# Patient Record
Sex: FEMALE | Race: WHITE | NOT HISPANIC OR LATINO
[De-identification: names, ages, dates, MRNs, and addresses within clinical notes are randomized per-mention and may not be internally consistent; named-entity substitution may affect disease eponyms.]

---

## 2017-01-05 ENCOUNTER — MEDICATION RENEWAL (OUTPATIENT)
Age: 32
End: 2017-01-05

## 2017-03-29 ENCOUNTER — EMERGENCY (EMERGENCY)
Facility: HOSPITAL | Age: 32
LOS: 1 days | Discharge: HOME | End: 2017-03-29
Attending: EMERGENCY MEDICINE | Admitting: EMERGENCY MEDICINE
Payer: COMMERCIAL

## 2017-03-29 VITALS
SYSTOLIC BLOOD PRESSURE: 123 MMHG | WEIGHT: 125 LBS | OXYGEN SATURATION: 100 % | RESPIRATION RATE: 20 BRPM | HEART RATE: 90 BPM | HEIGHT: 68 IN | TEMPERATURE: 98 F | DIASTOLIC BLOOD PRESSURE: 81 MMHG

## 2017-03-29 DIAGNOSIS — R69 ILLNESS, UNSPECIFIED: ICD-10-CM

## 2017-03-29 DIAGNOSIS — F41.1 GENERALIZED ANXIETY DISORDER: ICD-10-CM

## 2017-03-29 LAB
ALBUMIN SERPL ELPH-MCNC: 4.8 G/DL — SIGNIFICANT CHANGE UP (ref 3.3–5.2)
ALP SERPL-CCNC: 96 U/L — SIGNIFICANT CHANGE UP (ref 40–120)
ALT FLD-CCNC: 11 U/L — SIGNIFICANT CHANGE UP
ANION GAP SERPL CALC-SCNC: 15 MMOL/L — SIGNIFICANT CHANGE UP (ref 5–17)
APPEARANCE UR: CLEAR — SIGNIFICANT CHANGE UP
AST SERPL-CCNC: 17 U/L — SIGNIFICANT CHANGE UP
BASOPHILS # BLD AUTO: 0 K/UL — SIGNIFICANT CHANGE UP (ref 0–0.2)
BASOPHILS NFR BLD AUTO: 0.3 % — SIGNIFICANT CHANGE UP (ref 0–2)
BILIRUB SERPL-MCNC: 0.6 MG/DL — SIGNIFICANT CHANGE UP (ref 0.4–2)
BILIRUB UR-MCNC: NEGATIVE — SIGNIFICANT CHANGE UP
BUN SERPL-MCNC: 8 MG/DL — SIGNIFICANT CHANGE UP (ref 8–20)
CALCIUM SERPL-MCNC: 9.7 MG/DL — SIGNIFICANT CHANGE UP (ref 8.6–10.2)
CHLORIDE SERPL-SCNC: 100 MMOL/L — SIGNIFICANT CHANGE UP (ref 98–107)
CO2 SERPL-SCNC: 25 MMOL/L — SIGNIFICANT CHANGE UP (ref 22–29)
COLOR SPEC: YELLOW — SIGNIFICANT CHANGE UP
CREAT SERPL-MCNC: 0.55 MG/DL — SIGNIFICANT CHANGE UP (ref 0.5–1.3)
DIFF PNL FLD: ABNORMAL
EOSINOPHIL # BLD AUTO: 0 K/UL — SIGNIFICANT CHANGE UP (ref 0–0.5)
EOSINOPHIL NFR BLD AUTO: 0.2 % — SIGNIFICANT CHANGE UP (ref 0–6)
GLUCOSE SERPL-MCNC: 90 MG/DL — SIGNIFICANT CHANGE UP (ref 70–115)
GLUCOSE UR QL: NEGATIVE MG/DL — SIGNIFICANT CHANGE UP
HCG UR QL: NEGATIVE — SIGNIFICANT CHANGE UP
HCT VFR BLD CALC: 40.2 % — SIGNIFICANT CHANGE UP (ref 37–47)
HGB BLD-MCNC: 13.8 G/DL — SIGNIFICANT CHANGE UP (ref 12–16)
KETONES UR-MCNC: ABNORMAL
LEUKOCYTE ESTERASE UR-ACNC: ABNORMAL
LYMPHOCYTES # BLD AUTO: 1 K/UL — SIGNIFICANT CHANGE UP (ref 1–4.8)
LYMPHOCYTES # BLD AUTO: 14.5 % — LOW (ref 20–55)
MAGNESIUM SERPL-MCNC: 2.1 MG/DL — SIGNIFICANT CHANGE UP (ref 1.8–2.5)
MCHC RBC-ENTMCNC: 31.1 PG — HIGH (ref 27–31)
MCHC RBC-ENTMCNC: 34.3 G/DL — SIGNIFICANT CHANGE UP (ref 32–36)
MCV RBC AUTO: 90.5 FL — SIGNIFICANT CHANGE UP (ref 81–99)
MONOCYTES # BLD AUTO: 0.6 K/UL — SIGNIFICANT CHANGE UP (ref 0–0.8)
MONOCYTES NFR BLD AUTO: 8.6 % — SIGNIFICANT CHANGE UP (ref 3–10)
NEUTROPHILS # BLD AUTO: 5 K/UL — SIGNIFICANT CHANGE UP (ref 1.8–8)
NEUTROPHILS NFR BLD AUTO: 76.4 % — HIGH (ref 37–73)
NITRITE UR-MCNC: NEGATIVE — SIGNIFICANT CHANGE UP
PH UR: 5 — SIGNIFICANT CHANGE UP (ref 4.8–8)
PLATELET # BLD AUTO: 214 K/UL — SIGNIFICANT CHANGE UP (ref 150–400)
POTASSIUM SERPL-MCNC: 4.3 MMOL/L — SIGNIFICANT CHANGE UP (ref 3.5–5.3)
POTASSIUM SERPL-SCNC: 4.3 MMOL/L — SIGNIFICANT CHANGE UP (ref 3.5–5.3)
PROT SERPL-MCNC: 8.3 G/DL — SIGNIFICANT CHANGE UP (ref 6.6–8.7)
PROT UR-MCNC: 30 MG/DL
RBC # BLD: 4.44 M/UL — SIGNIFICANT CHANGE UP (ref 4.4–5.2)
RBC # FLD: 12 % — SIGNIFICANT CHANGE UP (ref 11–15.6)
SODIUM SERPL-SCNC: 140 MMOL/L — SIGNIFICANT CHANGE UP (ref 135–145)
SP GR SPEC: 1.02 — SIGNIFICANT CHANGE UP (ref 1.01–1.02)
T3 SERPL-MCNC: 141 NG/DL — SIGNIFICANT CHANGE UP (ref 80–200)
T4 AB SER-ACNC: 8.3 UG/DL — SIGNIFICANT CHANGE UP (ref 4.5–12)
TSH SERPL-MCNC: 1.44 UIU/ML — SIGNIFICANT CHANGE UP (ref 0.27–4.2)
UROBILINOGEN FLD QL: NEGATIVE MG/DL — SIGNIFICANT CHANGE UP
WBC # BLD: 6.6 K/UL — SIGNIFICANT CHANGE UP (ref 4.8–10.8)
WBC # FLD AUTO: 6.6 K/UL — SIGNIFICANT CHANGE UP (ref 4.8–10.8)

## 2017-03-29 PROCEDURE — 84480 ASSAY TRIIODOTHYRONINE (T3): CPT

## 2017-03-29 PROCEDURE — 85027 COMPLETE CBC AUTOMATED: CPT

## 2017-03-29 PROCEDURE — 99283 EMERGENCY DEPT VISIT LOW MDM: CPT

## 2017-03-29 PROCEDURE — 84443 ASSAY THYROID STIM HORMONE: CPT

## 2017-03-29 PROCEDURE — 90792 PSYCH DIAG EVAL W/MED SRVCS: CPT

## 2017-03-29 PROCEDURE — 83735 ASSAY OF MAGNESIUM: CPT

## 2017-03-29 PROCEDURE — 80053 COMPREHEN METABOLIC PANEL: CPT

## 2017-03-29 PROCEDURE — 99284 EMERGENCY DEPT VISIT MOD MDM: CPT | Mod: 25

## 2017-03-29 PROCEDURE — 81025 URINE PREGNANCY TEST: CPT

## 2017-03-29 PROCEDURE — 81001 URINALYSIS AUTO W/SCOPE: CPT

## 2017-03-29 PROCEDURE — 84436 ASSAY OF TOTAL THYROXINE: CPT

## 2017-03-29 RX ORDER — ESCITALOPRAM OXALATE 10 MG/1
0 TABLET, FILM COATED ORAL
Qty: 0 | Refills: 0 | COMMUNITY
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
## 2017-03-29 NOTE — ED ADULT TRIAGE NOTE - CHIEF COMPLAINT QUOTE
patient states feeling weak and nauseous with out appatite that startedsinc starting on new meds on saturday patient states started on Lexpro

## 2017-03-29 NOTE — ED STATDOCS - OBJECTIVE STATEMENT
30 y/o F pt w/ PMHx of anxiety presents to the ED c/o chronic anxiety, progressively worsening 5 days ago. Pt also notes generalized weakness, nausea, loss of appetite, diarrhea, and crying. Pt states that she was started on Lexapro on Saturday but feels that it is not working. Pt notes that she has been feeling very stressed as of late. Pt denies fever, cough, abd pain, chest pain, SOB, insomnia, recent foreign travel, or any other complaints. NKDA. Pt sees a mental health NP who started her on the medication. Pt has been seeing a psychiatrist for many years. Pt is a teacher.

## 2017-03-29 NOTE — ED BEHAVIORAL HEALTH ASSESSMENT NOTE - SUICIDE PROTECTIVE FACTORS
Positive therapeutic relationships/Future oriented/Engaged in work or school/Supportive social network or family

## 2017-03-29 NOTE — ED STATDOCS - NS ED MD SCRIBE ATTENDING SCRIBE SECTIONS
HIV/PAST MEDICAL/SURGICAL/SOCIAL HISTORY/VITAL SIGNS( Pullset)/DISPOSITION/REVIEW OF SYSTEMS/HISTORY OF PRESENT ILLNESS/PHYSICAL EXAM

## 2017-03-29 NOTE — ED ADULT NURSE NOTE - OBJECTIVE STATEMENT
pt presents to ED with nausea, decreased PO intake, anxiety secondary starting new RX of lexapro 5 days ago. pt states she has been feeling very amxious lately with recent weight loss of ~10lbs. breathing si even and unlabored. a&ox3 PERRL. maex4 lungs cta denies chest pain/sob. abd soft nt nd +bs. skin w.d.i, will continue to monitor and reassess

## 2017-03-29 NOTE — ED STATDOCS - PROGRESS NOTE DETAILS
patient re-evaluated c/o generalized nausea, lack of appetite and diahrea x 1 week, associated with anxiety, patient admits has a lot of stressors in life with marriage and family and see's therapist weekly x 4 years and was recently started on Lexapro x 4 days ago by psych NP.  She notes has been having worsening physical symptoms after starting medication.  She is a  and as a result of her symptoms has been affecting her daily day to day life activities.  She denies any abdominal pain, fevers, chills, sick contacts, recent travel or rashes.  PE: chest CTAB, heart s1s2, abdomen: soft, NT, ND.  Plan will contact psych for evaluation, pending labs.  Will re-eval spoke to psych MD Chin patient will need f/u and cognetive therapy, given information to f/u.

## 2017-03-29 NOTE — ED BEHAVIORAL HEALTH ASSESSMENT NOTE - DETAILS
sister OCD stroke - grandfather father alcoholism, brother polysubstance abuse traumatic first well woman's exam generalized fatigue nausea, gagging, diarrhea, decreased appetite self

## 2017-03-29 NOTE — ED BEHAVIORAL HEALTH ASSESSMENT NOTE - RISK ASSESSMENT
low - patient has severe anxiety with recent change in treatment, but many protective factors such as supportive family, therapeutic relationship, engagement in work and future oriented.

## 2017-03-29 NOTE — ED BEHAVIORAL HEALTH ASSESSMENT NOTE - SUMMARY
30 yo , domiciled,  female presents to ED for worsening anxiety after starting Lexapro x4 days. Patients anxiety and somatic symptoms have been present since before starting the Lexapro with the addition of gagging/ dry heaving and nausea, which started Sunday night when she started thinking about going into work the next day. Patient has extensive outpatient therapy, but it is unclear whether she has been receiving CBT. Recommend outpatient CBT and follow-up with her outpatient psychiatric NP regarding medication. Treat and release.

## 2017-03-29 NOTE — ED BEHAVIORAL HEALTH ASSESSMENT NOTE - DESCRIPTION
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

## 2018-05-12 ENCOUNTER — APPOINTMENT (OUTPATIENT)
Dept: OBGYN | Facility: CLINIC | Age: 33
End: 2018-05-12
Payer: COMMERCIAL

## 2018-05-12 VITALS
HEIGHT: 68 IN | WEIGHT: 135 LBS | BODY MASS INDEX: 20.46 KG/M2 | DIASTOLIC BLOOD PRESSURE: 60 MMHG | SYSTOLIC BLOOD PRESSURE: 100 MMHG

## 2018-05-12 PROCEDURE — 99213 OFFICE O/P EST LOW 20 MIN: CPT

## 2018-05-22 LAB
HSV+VZV DNA SPEC QL NAA+PROBE: NOT DETECTED
SPECIMEN SOURCE: NORMAL

## 2018-11-28 PROBLEM — M41.9 SCOLIOSIS, UNSPECIFIED: Chronic | Status: ACTIVE | Noted: 2017-03-29

## 2018-11-28 PROBLEM — F41.9 ANXIETY DISORDER, UNSPECIFIED: Chronic | Status: ACTIVE | Noted: 2017-03-29

## 2018-11-29 ENCOUNTER — APPOINTMENT (OUTPATIENT)
Dept: OBGYN | Facility: CLINIC | Age: 33
End: 2018-11-29
Payer: COMMERCIAL

## 2018-11-29 VITALS
BODY MASS INDEX: 21.73 KG/M2 | DIASTOLIC BLOOD PRESSURE: 78 MMHG | WEIGHT: 143.38 LBS | HEIGHT: 68 IN | SYSTOLIC BLOOD PRESSURE: 100 MMHG

## 2018-11-29 DIAGNOSIS — N76.0 ACUTE VAGINITIS: ICD-10-CM

## 2018-11-29 DIAGNOSIS — Z86.59 PERSONAL HISTORY OF OTHER MENTAL AND BEHAVIORAL DISORDERS: ICD-10-CM

## 2018-11-29 PROCEDURE — 99395 PREV VISIT EST AGE 18-39: CPT

## 2018-12-03 LAB — HPV HIGH+LOW RISK DNA PNL CVX: NOT DETECTED

## 2018-12-06 LAB — CYTOLOGY CVX/VAG DOC THIN PREP: NORMAL

## 2018-12-07 NOTE — ED BEHAVIORAL HEALTH ASSESSMENT NOTE - NS ED [facility] ED COURSE UTILIZATION OF 1 TO 1 IN ED YN
"Pt with concerns of side effects of omeprazole.  Pt states "i can 't take this".  Pt notified Omeprazole ordered for 30 days with 2 refills, not long term usage. Pt acknowledged understanding.   "
----- Message from Mame Thompson sent at 12/7/2018  2:06 PM CST -----  Contact: Patient  GI:  Patient called, she is concerned about taking Prilosec so often due to side effects.  She has multiple questions and is requesting a nurse call back to [phone].  Thank you  abc  
No

## 2019-10-14 ENCOUNTER — APPOINTMENT (OUTPATIENT)
Dept: ORTHOPEDIC SURGERY | Facility: CLINIC | Age: 34
End: 2019-10-14
Payer: COMMERCIAL

## 2019-10-14 VITALS
HEIGHT: 68 IN | SYSTOLIC BLOOD PRESSURE: 110 MMHG | WEIGHT: 147 LBS | HEART RATE: 80 BPM | DIASTOLIC BLOOD PRESSURE: 74 MMHG | BODY MASS INDEX: 22.28 KG/M2

## 2019-10-14 DIAGNOSIS — M25.561 PAIN IN RIGHT KNEE: ICD-10-CM

## 2019-10-14 PROCEDURE — 99203 OFFICE O/P NEW LOW 30 MIN: CPT

## 2019-10-14 PROCEDURE — 73503 X-RAY EXAM HIP UNI 4/> VIEWS: CPT | Mod: LT

## 2019-10-14 NOTE — HISTORY OF PRESENT ILLNESS
[Improving] : improving [8] : an average pain level of 8/10 [10] : a maximum pain level of 10/10 [Direct Pressure] : worsened by direct pressure [Rest] : relieved by rest [de-identified] : YAYO is a 33 year female who presents today for initial evaluation of left hip pain and bruising following a fall on 7/16/19.  The pain is dull, ahcy in nature.  She was evaluated at an  on 7/16/19.  Patient endorsed immediate pain and bruising.  She took Motrin initially but does not take any pain medications currently.  Her pain is located to her iliac crest and is worsened with direct pressure.  Wearing jeans increases her pain and she has been trying to wear loose fitting clothing.  Patient does not have pain motion/muscle contractions.  She denies numbness/tingling to the LE.  She also has medial right leg pain from impact during the fall as well.\par

## 2019-10-14 NOTE — PHYSICAL EXAM
[de-identified] : \par The following radiographs were ordered and read by me during this patients visit. I reviewed each radiograph in detail with the patient and discussed the findings as highlighted below. \par \par 4views of the left hip were obtained today that show no fracture, dislocation. There is no degenerative change seen. There is no malalignment. No obvious osseous abnormality. Otherwise unremarkable. [de-identified] : Physical Exam:\par General: Well appearing, no acute distress, A&O\par Neurologic: A&Ox3, No focal deficits\par Head: NCAT without abrasions, lacerations, or ecchymosis to head, face, or scalp\par Eyes: No scleral icterus, no gross abnormalities\par Respiratory: Equal chest wall expansion bilaterally, no accessory muscle use\par Lymphatic: No lymphadenopathy palpated\par Skin: Warm and dry\par Psychiatric: Normal mood and affect\par Left Hip Exam\par \par Skin: Clean/dry and intact\par Inspection: No obvious deformity, no swelling. Tenderness noted over the iliac crest. Bruising noted over this area.\par Pulses: 2+ DP/PT pulses\par ROM: 120 flexion without pain. Internal rotation to 15. External rotation to 45.\par Painful ROM: None\par Tenderness: No tenderness over greater trochanter/glut medius insertion. No groin pain. No ttp over the ASIS\par Strength: 5/5 ADD/ABD/Q/H/TA/GS/EHL\par Neuro: Sensation in tact to light touch throughout, DTR normal\par Additional tests: Negative impingement test\par \par Left knee\par \par \par Inspection/Palpation: There is no inguinal adenopathy. Well perfused, without skin lesions, shows a grossly normal motor and sensory examination. ROM: Normal\par Muscle/Nerves: Quad strength decreased secondary to injury. Motor exam 5/ distally, EHL/FHL/GSC/TA\par SILT L4-S1\par Special Tests: \par No Joint line tenderness noted  at medial and lateral joint line at 0 and 90 degrees. \par Stable in varus and valgus stress at 0 and 30 degrees\par Negative posterior drawer. \par Negative anterior drawer and stable Lachman \par Normal hip and ankle exam.\par \par Right Knee\par \par Inspection/Palpation: There is no inguinal adenopathy. Well perfused, without skin lesions, shows a grossly normal motor and sensory examination. Mild bruising noted over the past answering. Neck tenderness noted over this area as well to\par ROM: Normal\par Muscle/Nerves: Quad strength decreased secondary to injury. Motor exam 5/ distally, EHL/FHL/GSC/TA\par SILT L4-S1\par Special Tests: \par No Joint line tenderness noted  at medial and lateral joint line at 0 and 90 degrees. \par Stable in varus and valgus stress at 0 and 30 degrees\par Negative posterior drawer. \par Negative anterior drawer and stable Lachman \par Normal hip and ankle exam.\par

## 2019-10-14 NOTE — DISCUSSION/SUMMARY
[de-identified] : Key is a 33-year-old female sustained a fall onto her left hip and right knee 3 months ago. It appears she had a hip pointer with contusion over the iliac crest. She also has a contusion over the past answering. Then a thorough discussion with her regarding the nature of her symptoms as well as all treatment options. Discussed both operative and nonoperative management. At this time she elected for nonoperative management with oral anti-inflammatories physical therapy and ice to the areas. A repeat of her from meloxicam once daily for 21 days. A suture for physical therapy. I'll see her back 6-8 weeks if she shows no improvement. She agrees with the above plan and all questions were answered.

## 2019-10-14 NOTE — REVIEW OF SYSTEMS
[Joint Pain] : joint pain [Joint Stiffness] : joint stiffness [Negative] : Heme/Lymph [FreeTextEntry9] : right knee and left hip

## 2019-12-02 ENCOUNTER — APPOINTMENT (OUTPATIENT)
Dept: ORTHOPEDIC SURGERY | Facility: CLINIC | Age: 34
End: 2019-12-02
Payer: COMMERCIAL

## 2019-12-02 DIAGNOSIS — M25.552 PAIN IN LEFT HIP: ICD-10-CM

## 2019-12-02 PROCEDURE — 99213 OFFICE O/P EST LOW 20 MIN: CPT

## 2019-12-02 RX ORDER — LIDOCAINE 5% 5 G/100G
5 CREAM TOPICAL
Qty: 1 | Refills: 0 | Status: COMPLETED | COMMUNITY
Start: 2019-12-02 | End: 2019-12-17

## 2019-12-02 NOTE — PHYSICAL EXAM
[de-identified] : Physical Exam:\par General: Well appearing, no acute distress, A&O\par Neurologic: A&Ox3, No focal deficits\par Head: NCAT without abrasions, lacerations, or ecchymosis to head, face, or scalp\par Eyes: No scleral icterus, no gross abnormalities\par Respiratory: Equal chest wall expansion bilaterally, no accessory muscle use\par Lymphatic: No lymphadenopathy palpated\par Skin: Warm and dry\par Psychiatric: Normal mood and affect\par Left Hip Exam\par \par Skin: Clean/dry and intact\par Inspection: No obvious deformity, no swelling. Tenderness noted over the iliac crest. Bruising noted over this area.\par Pulses: 2+ DP/PT pulses\par ROM: 120 flexion without pain. Internal rotation to 15. External rotation to 45.\par Painful ROM: None\par Tenderness: No tenderness over greater trochanter/glut medius insertion. No groin pain. No ttp over the ASIS\par Strength: 5/5 ADD/ABD/Q/H/TA/GS/EHL\par Neuro: Sensation in tact to light touch throughout, DTR normal\par Additional tests: Negative impingement test\par \par Left knee\par \par \par Inspection/Palpation: There is no inguinal adenopathy. Well perfused, without skin lesions, shows a grossly normal motor and sensory examination. ROM: Normal\par Muscle/Nerves: Quad strength decreased secondary to injury. Motor exam 5/ distally, EHL/FHL/GSC/TA\par SILT L4-S1\par Special Tests: \par No Joint line tenderness noted  at medial and lateral joint line at 0 and 90 degrees. \par Stable in varus and valgus stress at 0 and 30 degrees\par Negative posterior drawer. \par Negative anterior drawer and stable Lachman \par Normal hip and ankle exam.\par \par Right Knee\par \par Inspection/Palpation: There is no inguinal adenopathy. Well perfused, without skin lesions, shows a grossly normal motor and sensory examination. Mild bruising noted over the past answering. Neck tenderness noted over this area as well to\par ROM: Normal\par Muscle/Nerves: Quad strength decreased secondary to injury. Motor exam 5/ distally, EHL/FHL/GSC/TA\par SILT L4-S1\par Special Tests: \par No Joint line tenderness noted  at medial and lateral joint line at 0 and 90 degrees. \par Stable in varus and valgus stress at 0 and 30 degrees\par Negative posterior drawer. \par Negative anterior drawer and stable Lachman \par Normal hip and ankle exam.\par

## 2019-12-02 NOTE — DISCUSSION/SUMMARY
[de-identified] : Key comes in for follow-up of her left hip pointer.  Overall she feels 60 to 70% improved.  She still has some weakness with hip abduction.  At this time given her increase in sensitivity I recommend lidocaine cream to be massaged through daily.  She will continue physical therapy as well.  I will see her back in 4 weeks for clinical reassessment.  If she has no change in symptoms we may consider an MRI.  She agrees with the above plan and all questions were answered.

## 2020-01-06 ENCOUNTER — APPOINTMENT (OUTPATIENT)
Dept: ORTHOPEDIC SURGERY | Facility: CLINIC | Age: 35
End: 2020-01-06
Payer: COMMERCIAL

## 2020-01-06 DIAGNOSIS — S30.1XXA CONTUSION OF ABDOMINAL WALL, INITIAL ENCOUNTER: ICD-10-CM

## 2020-01-06 PROCEDURE — 99213 OFFICE O/P EST LOW 20 MIN: CPT

## 2020-01-06 NOTE — HISTORY OF PRESENT ILLNESS
[de-identified] : YAYO is a 33 year female who presents today for followup evaluation of left hip pain and bruising following a fall on 7/16/19.  The pain has improved but is still noticable.  Patient complains of continued bruising. She has been going to physical therapy with some relief. She continues to feel restricted due to pain. She took a 21-day course of Meloxicam which provided minimal relief. Her pain is located to her iliac crest and is worsened with direct pressure.  Wearing jeans increases her pain and she has been trying to wear loose fitting clothing.  Patient does not have pain motion/muscle contractions.  She denies numbness/tingling to the LE.

## 2020-01-06 NOTE — DISCUSSION/SUMMARY
[de-identified] : Key comes in for follow-up of her left hip pointer.  She still has significant pain and bruising over the region.  She is attempted conservative measures over the past 6 months with some mild improvement.  Given that she has not had complete resolution I recommend an MRI to assess the soft tissues as well as the pelvis.  I will call her with the results.  She agrees with the above plan and all questions were answered.

## 2020-01-06 NOTE — PHYSICAL EXAM
[de-identified] : Physical Exam:\par General: Well appearing, no acute distress, A&O\par Neurologic: A&Ox3, No focal deficits\par Head: NCAT without abrasions, lacerations, or ecchymosis to head, face, or scalp\par Eyes: No scleral icterus, no gross abnormalities\par Respiratory: Equal chest wall expansion bilaterally, no accessory muscle use\par Lymphatic: No lymphadenopathy palpated\par Skin: Warm and dry\par Psychiatric: Normal mood and affect\par Left Hip Exam\par \par Skin: Clean/dry and intact\par Inspection: No obvious deformity, no swelling. Tenderness noted over the iliac crest. Bruising noted over this area.\par Pulses: 2+ DP/PT pulses\par ROM: 120 flexion without pain. Internal rotation to 15. External rotation to 45.\par Painful ROM: None\par Tenderness: No tenderness over greater trochanter/glut medius insertion. No groin pain. No ttp over the ASIS\par Strength: 5/5 ADD/ABD/Q/H/TA/GS/EHL\par Neuro: Sensation in tact to light touch throughout, DTR normal\par Additional tests: Negative impingement test\par \par Left knee\par \par \par Inspection/Palpation: There is no inguinal adenopathy. Well perfused, without skin lesions, shows a grossly normal motor and sensory examination. ROM: Normal\par Muscle/Nerves: Quad strength decreased secondary to injury. Motor exam 5/ distally, EHL/FHL/GSC/TA\par SILT L4-S1\par Special Tests: \par No Joint line tenderness noted  at medial and lateral joint line at 0 and 90 degrees. \par Stable in varus and valgus stress at 0 and 30 degrees\par Negative posterior drawer. \par Negative anterior drawer and stable Lachman \par Normal hip and ankle exam.\par \par Right Knee\par \par Inspection/Palpation: There is no inguinal adenopathy. Well perfused, without skin lesions, shows a grossly normal motor and sensory examination. Mild bruising noted over the past answering. Neck tenderness noted over this area as well to\par ROM: Normal\par Muscle/Nerves: Quad strength decreased secondary to injury. Motor exam 5/ distally, EHL/FHL/GSC/TA\par SILT L4-S1\par Special Tests: \par No Joint line tenderness noted  at medial and lateral joint line at 0 and 90 degrees. \par Stable in varus and valgus stress at 0 and 30 degrees\par Negative posterior drawer. \par Negative anterior drawer and stable Lachman \par Normal hip and ankle exam.\par

## 2020-01-08 ENCOUNTER — FORM ENCOUNTER (OUTPATIENT)
Age: 35
End: 2020-01-08

## 2020-01-09 ENCOUNTER — APPOINTMENT (OUTPATIENT)
Dept: MRI IMAGING | Facility: CLINIC | Age: 35
End: 2020-01-09
Payer: COMMERCIAL

## 2020-01-09 ENCOUNTER — OUTPATIENT (OUTPATIENT)
Dept: OUTPATIENT SERVICES | Facility: HOSPITAL | Age: 35
LOS: 1 days | End: 2020-01-09
Payer: COMMERCIAL

## 2020-01-09 DIAGNOSIS — S30.1XXA CONTUSION OF ABDOMINAL WALL, INITIAL ENCOUNTER: ICD-10-CM

## 2020-01-09 DIAGNOSIS — Z00.8 ENCOUNTER FOR OTHER GENERAL EXAMINATION: ICD-10-CM

## 2020-01-09 PROCEDURE — 72195 MRI PELVIS W/O DYE: CPT | Mod: 26

## 2020-01-09 PROCEDURE — 72195 MRI PELVIS W/O DYE: CPT

## 2020-04-08 ENCOUNTER — APPOINTMENT (OUTPATIENT)
Dept: OBGYN | Facility: CLINIC | Age: 35
End: 2020-04-08
Payer: COMMERCIAL

## 2020-04-08 PROCEDURE — 99443: CPT

## 2021-01-05 ENCOUNTER — APPOINTMENT (OUTPATIENT)
Dept: DERMATOLOGY | Facility: CLINIC | Age: 36
End: 2021-01-05
Payer: COMMERCIAL

## 2021-01-05 DIAGNOSIS — L91.8 OTHER HYPERTROPHIC DISORDERS OF THE SKIN: ICD-10-CM

## 2021-01-05 DIAGNOSIS — D22.5 MELANOCYTIC NEVI OF TRUNK: ICD-10-CM

## 2021-01-05 PROCEDURE — 99203 OFFICE O/P NEW LOW 30 MIN: CPT

## 2021-01-05 PROCEDURE — 99072 ADDL SUPL MATRL&STAF TM PHE: CPT

## 2021-01-05 RX ORDER — MELOXICAM 7.5 MG/1
7.5 TABLET ORAL DAILY
Qty: 21 | Refills: 1 | Status: DISCONTINUED | COMMUNITY
Start: 2019-10-14 | End: 2021-01-05

## 2021-01-05 RX ORDER — VALACYCLOVIR 500 MG/1
500 TABLET, FILM COATED ORAL TWICE DAILY
Qty: 60 | Refills: 3 | Status: DISCONTINUED | COMMUNITY
Start: 2018-05-12 | End: 2021-01-05

## 2021-01-05 RX ORDER — ACYCLOVIR 50 MG/G
5 CREAM TOPICAL
Qty: 1 | Refills: 3 | Status: DISCONTINUED | COMMUNITY
Start: 2018-05-12 | End: 2021-01-05

## 2021-01-05 NOTE — ASSESSMENT
[FreeTextEntry1] : Melanocytic nevus in right upper back\par Skin tag in right lateral canthus\par Probable incipient skin tags on both eyelids

## 2021-01-05 NOTE — PHYSICAL EXAM
[Alert] : alert [Oriented x 3] : ~L oriented x 3 [Well Nourished] : well nourished [FreeTextEntry3] : The following areas were examined and no significant abnormalities were seen except as noted below:\par \par Type II skin\par \par scalp, face, eyelids, nose, lips, ears, neck, chest, abdomen, back, buttocks, right arm, left arm, right hand, left hand,\par right  leg, left leg, right foot, left foot\par Breast and groin exams offered and declined by patient.\par \par Upper eyelids: Rare 1 mm skin-colored papules\par Small excrescence present on the right lateral canthus\par Right upper back: 6 x 4 mm brown mildly irregular smooth plaque-not suspicious on dermoscopy (patient states she has had this "a long time")\par \par No suspicious lesions seen

## 2021-01-05 NOTE — HISTORY OF PRESENT ILLNESS
[FreeTextEntry1] : Evaluation of growths [de-identified] : First visit for [age] white female presenting for evaluation of growths.  Due to the concern about lesions on the eyelids. No history of skin cancer.

## 2022-04-22 ENCOUNTER — APPOINTMENT (OUTPATIENT)
Dept: OBGYN | Facility: CLINIC | Age: 37
End: 2022-04-22
Payer: COMMERCIAL

## 2022-04-22 ENCOUNTER — TRANSCRIPTION ENCOUNTER (OUTPATIENT)
Age: 37
End: 2022-04-22

## 2022-04-22 VITALS
BODY MASS INDEX: 22.58 KG/M2 | WEIGHT: 149 LBS | SYSTOLIC BLOOD PRESSURE: 108 MMHG | DIASTOLIC BLOOD PRESSURE: 70 MMHG | HEIGHT: 68 IN

## 2022-04-22 DIAGNOSIS — Z01.419 ENCOUNTER FOR GYNECOLOGICAL EXAMINATION (GENERAL) (ROUTINE) W/OUT ABNORMAL FINDINGS: ICD-10-CM

## 2022-04-22 DIAGNOSIS — B00.9 HERPESVIRAL INFECTION, UNSPECIFIED: ICD-10-CM

## 2022-04-22 PROCEDURE — 99395 PREV VISIT EST AGE 18-39: CPT

## 2022-04-22 NOTE — HISTORY OF PRESENT ILLNESS
[FreeTextEntry1] : 35yo has had 3 bouts of HSV march into april..\par Her brother passed from an over dose.a year ago.\par

## 2022-04-22 NOTE — DISCUSSION/SUMMARY
[FreeTextEntry1] : disc. saving her eggs.\par she still has pain with having sex,\par she also is afraid of getting pregnant.\par She did do well with pelvic foor PT \par since her brother passed away she hasn’t even tried to have sex\par gave her daily valtrrex she can do episodic treatment too.\par cycles are 12 days long and 1 day of pain ,disc OC which may relax her about getting preg too. she will talk to her

## 2022-04-23 LAB — HPV HIGH+LOW RISK DNA PNL CVX: NOT DETECTED

## 2022-05-02 LAB — CYTOLOGY CVX/VAG DOC THIN PREP: NORMAL

## 2022-08-08 ENCOUNTER — APPOINTMENT (OUTPATIENT)
Dept: FAMILY MEDICINE | Facility: CLINIC | Age: 37
End: 2022-08-08

## 2022-08-08 VITALS
OXYGEN SATURATION: 98 % | HEIGHT: 68 IN | RESPIRATION RATE: 16 BRPM | HEART RATE: 80 BPM | BODY MASS INDEX: 21.82 KG/M2 | TEMPERATURE: 97.7 F | WEIGHT: 144 LBS | SYSTOLIC BLOOD PRESSURE: 110 MMHG | DIASTOLIC BLOOD PRESSURE: 70 MMHG

## 2022-08-08 DIAGNOSIS — Z13.220 ENCOUNTER FOR SCREENING FOR LIPOID DISORDERS: ICD-10-CM

## 2022-08-08 DIAGNOSIS — Z13.21 ENCOUNTER FOR SCREENING FOR NUTRITIONAL DISORDER: ICD-10-CM

## 2022-08-08 DIAGNOSIS — U07.1 COVID-19: ICD-10-CM

## 2022-08-08 DIAGNOSIS — Z13.29 ENCOUNTER FOR SCREENING FOR OTHER SUSPECTED ENDOCRINE DISORDER: ICD-10-CM

## 2022-08-08 DIAGNOSIS — Z82.3 FAMILY HISTORY OF STROKE: ICD-10-CM

## 2022-08-08 DIAGNOSIS — Z13.228 ENCOUNTER FOR SCREENING FOR OTHER SUSPECTED ENDOCRINE DISORDER: ICD-10-CM

## 2022-08-08 DIAGNOSIS — Z13.0 ENCOUNTER FOR SCREENING FOR OTHER SUSPECTED ENDOCRINE DISORDER: ICD-10-CM

## 2022-08-08 PROCEDURE — 99385 PREV VISIT NEW AGE 18-39: CPT | Mod: 25

## 2022-08-08 PROCEDURE — 96127 BRIEF EMOTIONAL/BEHAV ASSMT: CPT

## 2022-08-08 RX ORDER — ESCITALOPRAM OXALATE 10 MG/1
10 TABLET, FILM COATED ORAL
Refills: 0 | Status: DISCONTINUED | COMMUNITY
End: 2022-08-08

## 2022-08-08 NOTE — HISTORY OF PRESENT ILLNESS
[FreeTextEntry1] : Establish care, annual physical  [de-identified] : 36 year old female presents as a new patient to establish care, for annual physical \par \par She has anxiety/depression\par she follows with a  as well as a psychiatric NP\par has gone for cognitive behavioral therapy in the past \par currently on Lexapro 20 mg daily\par finds she does not do well without routine/structure\par she finds it hard during the summer when she is not working\par finds she over thinks often\par also still grieving- her brother passed away 2 years ago from a drug overdose\par her grandmother passed away 1 and 1/2 years ago \par reconnected with her father when her brother passed away (her parents are , father is remarried and lives in [place])\par she often visits her sisters who live in [place] \par \par h/o genital herpes\par takes Valtrex when needed \par \par reports having heavy menses \par \par h/o painful intercourse\par has tried using vaginal dilators previously \par \par follows with dermatology for skin examinations- last seen in Dec 2021

## 2022-08-08 NOTE — PHYSICAL EXAM
[No Acute Distress] : no acute distress [Well Nourished] : well nourished [Well Developed] : well developed [Well-Appearing] : well-appearing [Normal Sclera/Conjunctiva] : normal sclera/conjunctiva [PERRL] : pupils equal round and reactive to light [Normal Outer Ear/Nose] : the outer ears and nose were normal in appearance [Normal Oropharynx] : the oropharynx was normal [Normal TMs] : both tympanic membranes were normal [Normal Appearance] : was normal in appearance [Neck Supple] : was supple [Normal] : the thyroid was normal [Normal Rate] : normal rate  [Regular Rhythm] : with a regular rhythm [Normal S1, S2] : normal S1 and S2 [No Murmur] : no murmur heard [No Carotid Bruits] : no carotid bruits [No Edema] : there was no peripheral edema [Soft] : abdomen soft [Non Tender] : non-tender [Normal Bowel Sounds] : normal bowel sounds [Normal Posterior Cervical Nodes] : no posterior cervical lymphadenopathy [Normal Anterior Cervical Nodes] : no anterior cervical lymphadenopathy [No Spinal Tenderness] : no spinal tenderness [Grossly Normal Strength/Tone] : grossly normal strength/tone [No Rash] : no rash [No Focal Deficits] : no focal deficits [Alert and Oriented x3] : oriented to person, place, and time [Normal Insight/Judgement] : insight and judgment were intact

## 2022-08-08 NOTE — ASSESSMENT
[FreeTextEntry1] : Health care maintenance:\par check blood work, blood drawn in office\par follow up with optometry/ophthalmology and dentist for routine exams when due\par follow up with dermatology when due \par up to date with GYN \par c/w diet and exercise \par recommend Flu vaccine in the fall\par \par Anxiety/Depression:\par on Lexapro 20 mg daily, has f/u scheduled with her psychiatric NP next month\par follows with her therapist/ every other week\par consider referral to behavioral health counselor \par check blood work \par \par

## 2022-08-08 NOTE — HEALTH RISK ASSESSMENT
[Never] : Never [No] : In the past 12 months have you used drugs other than those required for medical reasons? No [2] : 2) Feeling down, depressed, or hopeless for more than half of the days (2) [PHQ-2 Positive] : PHQ-2 Positive [PHQ-9 Positive] : PHQ-9 Positive [I have developed a follow-up plan documented below in the note.] : I have developed a follow-up plan documented below in the note. [Patient reported PAP Smear was normal] : Patient reported PAP Smear was normal [With Significant Other] : lives with significant other [Employed] : employed [] :  [Seat Belt] :  uses seat belt [Sunscreen] : uses sunscreen [de-identified] : Walks  [[id number]] : 4 [PapSmearDate] : 04/2022 [FreeTextEntry2] : High School

## 2022-08-09 LAB
25(OH)D3 SERPL-MCNC: 41.6 NG/ML
ALBUMIN SERPL ELPH-MCNC: 4.8 G/DL
ALP BLD-CCNC: 96 U/L
ALT SERPL-CCNC: 12 U/L
ANION GAP SERPL CALC-SCNC: 15 MMOL/L
AST SERPL-CCNC: 17 U/L
BASOPHILS # BLD AUTO: 0.03 K/UL
BASOPHILS NFR BLD AUTO: 0.7 %
BILIRUB SERPL-MCNC: 0.4 MG/DL
BUN SERPL-MCNC: 12 MG/DL
CALCIUM SERPL-MCNC: 9.6 MG/DL
CHLORIDE SERPL-SCNC: 104 MMOL/L
CHOLEST SERPL-MCNC: 165 MG/DL
CO2 SERPL-SCNC: 20 MMOL/L
CREAT SERPL-MCNC: 0.69 MG/DL
EGFR: 116 ML/MIN/1.73M2
EOSINOPHIL # BLD AUTO: 0.17 K/UL
EOSINOPHIL NFR BLD AUTO: 4 %
ESTIMATED AVERAGE GLUCOSE: 100 MG/DL
FERRITIN SERPL-MCNC: 49 NG/ML
FOLATE SERPL-MCNC: >20 NG/ML
GLUCOSE SERPL-MCNC: 81 MG/DL
HBA1C MFR BLD HPLC: 5.1 %
HCT VFR BLD CALC: 36.4 %
HDLC SERPL-MCNC: 55 MG/DL
HGB BLD-MCNC: 11.6 G/DL
IMM GRANULOCYTES NFR BLD AUTO: 0.2 %
IRON SATN MFR SERPL: 32 %
IRON SERPL-MCNC: 97 UG/DL
LDLC SERPL CALC-MCNC: 89 MG/DL
LYMPHOCYTES # BLD AUTO: 1.66 K/UL
LYMPHOCYTES NFR BLD AUTO: 38.9 %
MAGNESIUM SERPL-MCNC: 2.2 MG/DL
MAN DIFF?: NORMAL
MCHC RBC-ENTMCNC: 30.1 PG
MCHC RBC-ENTMCNC: 31.9 GM/DL
MCV RBC AUTO: 94.5 FL
MONOCYTES # BLD AUTO: 0.47 K/UL
MONOCYTES NFR BLD AUTO: 11 %
NEUTROPHILS # BLD AUTO: 1.93 K/UL
NEUTROPHILS NFR BLD AUTO: 45.2 %
NONHDLC SERPL-MCNC: 110 MG/DL
PLATELET # BLD AUTO: 202 K/UL
POTASSIUM SERPL-SCNC: 4.3 MMOL/L
PROT SERPL-MCNC: 7.4 G/DL
RBC # BLD: 3.85 M/UL
RBC # FLD: 12.5 %
SODIUM SERPL-SCNC: 139 MMOL/L
T3FREE SERPL-MCNC: 3.14 PG/ML
T4 FREE SERPL-MCNC: 1 NG/DL
TIBC SERPL-MCNC: 302 UG/DL
TRIGL SERPL-MCNC: 104 MG/DL
TSH SERPL-ACNC: 1.52 UIU/ML
UIBC SERPL-MCNC: 205 UG/DL
VIT B12 SERPL-MCNC: 606 PG/ML
WBC # FLD AUTO: 4.27 K/UL

## 2022-08-30 ENCOUNTER — APPOINTMENT (OUTPATIENT)
Dept: ANTEPARTUM | Facility: CLINIC | Age: 37
End: 2022-08-30

## 2022-08-30 ENCOUNTER — APPOINTMENT (OUTPATIENT)
Dept: OBGYN | Facility: CLINIC | Age: 37
End: 2022-08-30

## 2022-08-30 ENCOUNTER — ASOB RESULT (OUTPATIENT)
Age: 37
End: 2022-08-30

## 2022-08-30 VITALS
HEIGHT: 68 IN | BODY MASS INDEX: 21.98 KG/M2 | SYSTOLIC BLOOD PRESSURE: 128 MMHG | WEIGHT: 145 LBS | DIASTOLIC BLOOD PRESSURE: 80 MMHG

## 2022-08-30 DIAGNOSIS — R10.2 PELVIC AND PERINEAL PAIN: ICD-10-CM

## 2022-08-30 DIAGNOSIS — N80.9 ENDOMETRIOSIS, UNSPECIFIED: ICD-10-CM

## 2022-08-30 LAB
APPEARANCE: CLEAR
BACTERIA: NEGATIVE
BILIRUBIN URINE: NEGATIVE
BLOOD URINE: NEGATIVE
COLOR: NORMAL
GLUCOSE QUALITATIVE U: NEGATIVE
HYALINE CASTS: 0 /LPF
KETONES URINE: NEGATIVE
LEUKOCYTE ESTERASE URINE: NEGATIVE
MICROSCOPIC-UA: NORMAL
NITRITE URINE: NEGATIVE
PH URINE: 7.5
PROTEIN URINE: NEGATIVE
RED BLOOD CELLS URINE: 13 /HPF
SPECIFIC GRAVITY URINE: 1.02
SQUAMOUS EPITHELIAL CELLS: 2 /HPF
UROBILINOGEN URINE: NORMAL
WHITE BLOOD CELLS URINE: 1 /HPF

## 2022-08-30 PROCEDURE — 99213 OFFICE O/P EST LOW 20 MIN: CPT

## 2022-08-30 PROCEDURE — 76856 US EXAM PELVIC COMPLETE: CPT

## 2022-08-30 NOTE — HISTORY OF PRESENT ILLNESS
[FreeTextEntry1] : 37 yo here to review the pelvic sono. SHe has had painful sex and periods for yrs, prob. vulvodynia .

## 2022-08-30 NOTE — DISCUSSION/SUMMARY
[FreeTextEntry1] : reviewed the sono ,normal other than small follicle\par w disc pelvic MRI to ro endo.\par  I also disc seeing Dr. Braxton Cadena for poss gabapentin vag supp. \par SHe has done pelvic floor PT with some help. \par rx connie

## 2022-09-02 ENCOUNTER — TRANSCRIPTION ENCOUNTER (OUTPATIENT)
Age: 37
End: 2022-09-02

## 2022-09-06 ENCOUNTER — TRANSCRIPTION ENCOUNTER (OUTPATIENT)
Age: 37
End: 2022-09-06

## 2022-09-20 ENCOUNTER — TRANSCRIPTION ENCOUNTER (OUTPATIENT)
Age: 37
End: 2022-09-20

## 2022-09-20 LAB
APPEARANCE: CLEAR
BACTERIA: NEGATIVE
BILIRUBIN URINE: NEGATIVE
BLOOD URINE: NEGATIVE
COLOR: NORMAL
GLUCOSE QUALITATIVE U: NEGATIVE
HYALINE CASTS: 3 /LPF
KETONES URINE: NEGATIVE
LEUKOCYTE ESTERASE URINE: NEGATIVE
MICROSCOPIC-UA: NORMAL
NITRITE URINE: NEGATIVE
PH URINE: 7
PROTEIN URINE: NORMAL
RED BLOOD CELLS URINE: 5 /HPF
SPECIFIC GRAVITY URINE: 1.02
SQUAMOUS EPITHELIAL CELLS: 2 /HPF
UROBILINOGEN URINE: NORMAL
WHITE BLOOD CELLS URINE: 0 /HPF

## 2022-09-26 ENCOUNTER — OUTPATIENT (OUTPATIENT)
Dept: OUTPATIENT SERVICES | Facility: HOSPITAL | Age: 37
LOS: 1 days | End: 2022-09-26
Payer: COMMERCIAL

## 2022-09-26 ENCOUNTER — APPOINTMENT (OUTPATIENT)
Dept: MRI IMAGING | Facility: CLINIC | Age: 37
End: 2022-09-26

## 2022-09-26 DIAGNOSIS — R10.2 PELVIC AND PERINEAL PAIN: ICD-10-CM

## 2022-09-26 PROCEDURE — 72197 MRI PELVIS W/O & W/DYE: CPT | Mod: 26

## 2022-09-26 PROCEDURE — 72197 MRI PELVIS W/O & W/DYE: CPT

## 2022-09-26 PROCEDURE — A9585: CPT

## 2023-04-10 ENCOUNTER — APPOINTMENT (OUTPATIENT)
Dept: FAMILY MEDICINE | Facility: CLINIC | Age: 38
End: 2023-04-10
Payer: COMMERCIAL

## 2023-04-10 VITALS
TEMPERATURE: 97.9 F | WEIGHT: 153 LBS | HEIGHT: 68 IN | HEART RATE: 92 BPM | BODY MASS INDEX: 23.19 KG/M2 | SYSTOLIC BLOOD PRESSURE: 116 MMHG | DIASTOLIC BLOOD PRESSURE: 76 MMHG | OXYGEN SATURATION: 98 %

## 2023-04-10 DIAGNOSIS — J06.9 ACUTE UPPER RESPIRATORY INFECTION, UNSPECIFIED: ICD-10-CM

## 2023-04-10 PROCEDURE — 99213 OFFICE O/P EST LOW 20 MIN: CPT | Mod: 25

## 2023-04-10 PROCEDURE — 96127 BRIEF EMOTIONAL/BEHAV ASSMT: CPT

## 2023-04-10 NOTE — HEALTH RISK ASSESSMENT
[0] : 1) Little interest or pleasure doing things: Not at all (0) [1] : 2) Feeling down, depressed, or hopeless for several days (1) [PHQ-2 Negative - No further assessment needed] : PHQ-2 Negative - No further assessment needed [[id number]] : 1

## 2023-04-10 NOTE — HISTORY OF PRESENT ILLNESS
[FreeTextEntry1] : Follow up [de-identified] : \par 37 year old female presents for a follow up\par Has anxiety/depression- on Lexapro 20 mg daily\par doing well \par she has come to find her psychiatric NP is no longer practicing at the office patient had been going to \par looking to see if medication can prescribed from our office\par she also follows with a therapist regularly\par \par c/o sinus congestion and cough x 1 week\par no fever or chills\par she tested negative for COVID on an at home test 3 days ago\par

## 2023-04-10 NOTE — REVIEW OF SYSTEMS
[Cough] : cough [Fever] : no fever [Chills] : no chills [Chest Pain] : no chest pain [Shortness Of Breath] : no shortness of breath [FreeTextEntry4] : sinus congestion

## 2023-04-10 NOTE — ASSESSMENT
[FreeTextEntry1] : Anxiety/Depression\par - stable\par - c/w Lexapro\par \par Acute URI\par - likely with viral URI\par - c/w supportive treatment\par - recommend Fluticasone nasal spray\par \par return or call back if symptoms worsen and/or don't improve\par return in August 2023 for her annual physical

## 2023-04-10 NOTE — PHYSICAL EXAM
[No Acute Distress] : no acute distress [Well Nourished] : well nourished [Well Developed] : well developed [Normal Oropharynx] : the oropharynx was normal [Normal Appearance] : was normal in appearance [Neck Supple] : was supple [No Respiratory Distress] : no respiratory distress  [Clear to Auscultation] : lungs were clear to auscultation bilaterally [Normal Rate] : normal rate  [Regular Rhythm] : with a regular rhythm [Normal S1, S2] : normal S1 and S2 [No Murmur] : no murmur heard [Normal Anterior Cervical Nodes] : no anterior cervical lymphadenopathy [Alert and Oriented x3] : oriented to person, place, and time [de-identified] : scarring to left TM, no erythema, right TM with fluid, no erythema

## 2023-10-13 ENCOUNTER — RX RENEWAL (OUTPATIENT)
Age: 38
End: 2023-10-13

## 2023-11-07 ENCOUNTER — APPOINTMENT (OUTPATIENT)
Dept: FAMILY MEDICINE | Facility: CLINIC | Age: 38
End: 2023-11-07
Payer: COMMERCIAL

## 2023-11-07 VITALS
OXYGEN SATURATION: 98 % | BODY MASS INDEX: 23.95 KG/M2 | HEART RATE: 70 BPM | WEIGHT: 158 LBS | DIASTOLIC BLOOD PRESSURE: 70 MMHG | TEMPERATURE: 97.2 F | HEIGHT: 68 IN | SYSTOLIC BLOOD PRESSURE: 118 MMHG

## 2023-11-07 VITALS
RESPIRATION RATE: 16 BRPM | WEIGHT: 154 LBS | TEMPERATURE: 97.1 F | DIASTOLIC BLOOD PRESSURE: 70 MMHG | BODY MASS INDEX: 23.34 KG/M2 | HEIGHT: 68 IN | OXYGEN SATURATION: 98 % | SYSTOLIC BLOOD PRESSURE: 114 MMHG | HEART RATE: 82 BPM

## 2023-11-07 DIAGNOSIS — F32.A ANXIETY DISORDER, UNSPECIFIED: ICD-10-CM

## 2023-11-07 DIAGNOSIS — F41.9 ANXIETY DISORDER, UNSPECIFIED: ICD-10-CM

## 2023-11-07 DIAGNOSIS — Z00.00 ENCOUNTER FOR GENERAL ADULT MEDICAL EXAMINATION W/OUT ABNORMAL FINDINGS: ICD-10-CM

## 2023-11-07 PROCEDURE — 99395 PREV VISIT EST AGE 18-39: CPT

## 2023-11-07 RX ORDER — ESCITALOPRAM OXALATE 20 MG/1
20 TABLET ORAL
Qty: 90 | Refills: 1 | Status: ACTIVE | COMMUNITY
Start: 2022-06-26 | End: 1900-01-01

## 2023-12-13 ENCOUNTER — APPOINTMENT (OUTPATIENT)
Dept: FAMILY MEDICINE | Facility: CLINIC | Age: 38
End: 2023-12-13
Payer: COMMERCIAL

## 2023-12-13 VITALS
HEIGHT: 68 IN | TEMPERATURE: 97.1 F | BODY MASS INDEX: 23.49 KG/M2 | DIASTOLIC BLOOD PRESSURE: 80 MMHG | SYSTOLIC BLOOD PRESSURE: 126 MMHG | OXYGEN SATURATION: 98 % | HEART RATE: 118 BPM | WEIGHT: 155 LBS

## 2023-12-13 DIAGNOSIS — J02.9 ACUTE PHARYNGITIS, UNSPECIFIED: ICD-10-CM

## 2023-12-13 DIAGNOSIS — K25.3 ACUTE GASTRIC ULCER W/OUT HEMORRHAGE OR PERFORATION: ICD-10-CM

## 2023-12-13 PROCEDURE — 99214 OFFICE O/P EST MOD 30 MIN: CPT

## 2023-12-13 NOTE — HISTORY OF PRESENT ILLNESS
[FreeTextEntry8] : Pt presenting for acute concerns Has a sore throat- works as teacher  has a pain in the middle of her stomach, went to [facility]- started ppi 3 days ago no improvement denies any n/v/d/c, no fevers or chills unsure if MSK related  has been taking motrin/naproxen for pain lots of stress

## 2023-12-13 NOTE — REVIEW OF SYSTEMS
[Cough] : cough [Abdominal Pain] : abdominal pain [Constipation] : no constipation [Diarrhea] : diarrhea [Vomiting] : no vomiting [Heartburn] : no heartburn [Melena] : no melena [Negative] : Musculoskeletal

## 2023-12-13 NOTE — ASSESSMENT
[FreeTextEntry1] : stomach pain poss ulcer 2./2 stress, NSAID use discussed PPI x 6 weeks f.u with GI  avoid NSAIDs  Sore throat- most likely viral discussed OTC rememdies

## 2023-12-27 ENCOUNTER — NON-APPOINTMENT (OUTPATIENT)
Age: 38
End: 2023-12-27

## 2023-12-27 ENCOUNTER — APPOINTMENT (OUTPATIENT)
Dept: GASTROENTEROLOGY | Facility: CLINIC | Age: 38
End: 2023-12-27
Payer: COMMERCIAL

## 2023-12-27 VITALS
SYSTOLIC BLOOD PRESSURE: 118 MMHG | OXYGEN SATURATION: 98 % | HEIGHT: 68 IN | WEIGHT: 155 LBS | DIASTOLIC BLOOD PRESSURE: 74 MMHG | BODY MASS INDEX: 23.49 KG/M2 | HEART RATE: 90 BPM | RESPIRATION RATE: 16 BRPM

## 2023-12-27 PROCEDURE — 99204 OFFICE O/P NEW MOD 45 MIN: CPT

## 2023-12-27 RX ORDER — MUPIROCIN 20 MG/G
2 OINTMENT TOPICAL
Qty: 22 | Refills: 0 | Status: DISCONTINUED | COMMUNITY
Start: 2022-11-16 | End: 2023-12-27

## 2023-12-27 RX ORDER — KETOCONAZOLE 20.5 MG/ML
2 SHAMPOO, SUSPENSION TOPICAL
Qty: 120 | Refills: 0 | Status: DISCONTINUED | COMMUNITY
Start: 2022-12-19 | End: 2023-12-27

## 2023-12-27 RX ORDER — TRIAMCINOLONE ACETONIDE 1 MG/G
0.1 OINTMENT TOPICAL
Qty: 80 | Refills: 0 | Status: DISCONTINUED | COMMUNITY
Start: 2022-11-16 | End: 2023-12-27

## 2023-12-27 RX ORDER — VALACYCLOVIR 500 MG/1
500 TABLET, FILM COATED ORAL
Qty: 30 | Refills: 6 | Status: DISCONTINUED | COMMUNITY
Start: 2022-04-22 | End: 2023-12-27

## 2023-12-27 NOTE — PHYSICAL EXAM
[Alert] : alert [Normal Voice/Communication] : normal voice/communication [Healthy Appearing] : healthy appearing [No Acute Distress] : no acute distress [Well Developed] : well developed [Well Nourished] : well nourished [Sclera] : the sclera and conjunctiva were normal [Hearing Threshold Finger Rub Not [place]] : hearing was normal [Normal Lips/Gums] : the lips and gums were normal [Oropharynx] : the oropharynx was normal [Normal Appearance] : the appearance of the neck was normal [No Neck Mass] : no neck mass was observed [No Respiratory Distress] : no respiratory distress [No Acc Muscle Use] : no accessory muscle use [Respiration, Rhythm And Depth] : normal respiratory rhythm and effort [Auscultation Breath Sounds / Voice Sounds] : lungs were clear to auscultation bilaterally [Heart Rate And Rhythm] : heart rate was normal and rhythm regular [Normal S1, S2] : normal S1 and S2 [Murmurs] : no murmurs [None] : no edema [Bowel Sounds] : normal bowel sounds [No Masses] : no abdominal mass palpated [Abdomen Soft] : soft [] : no hepatosplenomegaly [Epigastric] : in the epigastric area [Periumbilical] : not in the periumbilical area [Suprapubic] : not in the suprapubic area [RUQ] : not in the in the right upper quadrant [RLQ] : not in the right lower quadrant [LUQ] : not in the left upper quadrant [LLQ] : not in the left lower quadrant [No CVA Tenderness] : no CVA  tenderness [Abnormal Walk] : normal gait [No Joint Swelling] : no joint swelling seen [Normal Color / Pigmentation] : normal skin color and pigmentation [Oriented To Time, Place, And Person] : oriented to person, place, and time [de-identified] : Patient with epigastric tenderness to deep palpation [de-identified] : Deferred at this time

## 2023-12-27 NOTE — ASSESSMENT
[FreeTextEntry1] : Impression: Chronic epigastric pain rule out ulcer disease and/or gastritis and/or esophagitis and/or H. pylori infection.  Recommendations: Upper endoscopy was advised for further evaluation of the above.  The risk versus benefits of upper endoscopy and intravenous sedation, and alternative testing such as upper GI series, were individually explained to the patient today who appeared to understand all of the above and was agreeable to proceeding with upper endoscopy.  Her ASA classification is 1 and she is medically optimized for the proposed upper endoscopy and seemed to understand all of the above instructions, information, and management plan.

## 2024-01-13 ENCOUNTER — LABORATORY RESULT (OUTPATIENT)
Age: 39
End: 2024-01-13

## 2024-01-16 ENCOUNTER — TRANSCRIPTION ENCOUNTER (OUTPATIENT)
Age: 39
End: 2024-01-16

## 2024-01-16 LAB
25(OH)D3 SERPL-MCNC: 41.8 NG/ML
ALBUMIN SERPL ELPH-MCNC: 4.8 G/DL
ALP BLD-CCNC: 112 U/L
ALT SERPL-CCNC: 17 U/L
ANION GAP SERPL CALC-SCNC: 14 MMOL/L
APPEARANCE: CLEAR
AST SERPL-CCNC: 19 U/L
BACTERIA: NEGATIVE /HPF
BASOPHILS # BLD AUTO: 0.04 K/UL
BASOPHILS NFR BLD AUTO: 0.9 %
BILIRUB SERPL-MCNC: 0.5 MG/DL
BILIRUBIN URINE: NEGATIVE
BLOOD URINE: NEGATIVE
BUN SERPL-MCNC: 10 MG/DL
CALCIUM SERPL-MCNC: 10 MG/DL
CAST: 2 /LPF
CHLORIDE SERPL-SCNC: 102 MMOL/L
CHOLEST SERPL-MCNC: 172 MG/DL
CO2 SERPL-SCNC: 22 MMOL/L
COLOR: NORMAL
CREAT SERPL-MCNC: 0.78 MG/DL
EGFR: 100 ML/MIN/1.73M2
EOSINOPHIL # BLD AUTO: 0.08 K/UL
EOSINOPHIL NFR BLD AUTO: 1.7 %
EPITHELIAL CELLS: 10 /HPF
ESTIMATED AVERAGE GLUCOSE: 105 MG/DL
FERRITIN SERPL-MCNC: 36 NG/ML
FOLATE SERPL-MCNC: >20 NG/ML
GLUCOSE QUALITATIVE U: NEGATIVE MG/DL
GLUCOSE SERPL-MCNC: 82 MG/DL
HBA1C MFR BLD HPLC: 5.3 %
HCT VFR BLD CALC: 42.2 %
HDLC SERPL-MCNC: 58 MG/DL
HGB BLD-MCNC: 12.9 G/DL
IRON SATN MFR SERPL: 17 %
IRON SERPL-MCNC: 53 UG/DL
KETONES URINE: ABNORMAL MG/DL
LDLC SERPL CALC-MCNC: 96 MG/DL
LEUKOCYTE ESTERASE URINE: NEGATIVE
LYMPHOCYTES # BLD AUTO: 1.88 K/UL
LYMPHOCYTES NFR BLD AUTO: 40.3 %
MAN DIFF?: NORMAL
MCHC RBC-ENTMCNC: 29.8 PG
MCHC RBC-ENTMCNC: 30.6 GM/DL
MCV RBC AUTO: 97.5 FL
MICROSCOPIC-UA: NORMAL
MONOCYTES # BLD AUTO: 0.78 K/UL
MONOCYTES NFR BLD AUTO: 16.7 %
NEUTROPHILS # BLD AUTO: 1.68 K/UL
NEUTROPHILS NFR BLD AUTO: 36 %
NITRITE URINE: NEGATIVE
NONHDLC SERPL-MCNC: 114 MG/DL
PH URINE: 6
PLATELET # BLD AUTO: 237 K/UL
POTASSIUM SERPL-SCNC: 4.3 MMOL/L
PROT SERPL-MCNC: 7.5 G/DL
PROTEIN URINE: NORMAL MG/DL
RBC # BLD: 4.33 M/UL
RBC # FLD: 12 %
RED BLOOD CELLS URINE: 4 /HPF
SODIUM SERPL-SCNC: 138 MMOL/L
SPECIFIC GRAVITY URINE: 1.02
T4 FREE SERPL-MCNC: 1.1 NG/DL
TIBC SERPL-MCNC: 314 UG/DL
TRIGL SERPL-MCNC: 98 MG/DL
TSH SERPL-ACNC: 1.08 UIU/ML
UIBC SERPL-MCNC: 261 UG/DL
UROBILINOGEN URINE: 1 MG/DL
VIT B12 SERPL-MCNC: 833 PG/ML
WBC # FLD AUTO: 4.66 K/UL
WHITE BLOOD CELLS URINE: 0 /HPF

## 2024-01-24 ENCOUNTER — TRANSCRIPTION ENCOUNTER (OUTPATIENT)
Age: 39
End: 2024-01-24

## 2024-02-04 ENCOUNTER — TRANSCRIPTION ENCOUNTER (OUTPATIENT)
Age: 39
End: 2024-02-04

## 2024-02-05 ENCOUNTER — OUTPATIENT (OUTPATIENT)
Dept: OUTPATIENT SERVICES | Facility: HOSPITAL | Age: 39
LOS: 1 days | End: 2024-02-05
Payer: COMMERCIAL

## 2024-02-05 ENCOUNTER — RESULT REVIEW (OUTPATIENT)
Age: 39
End: 2024-02-05

## 2024-02-05 ENCOUNTER — APPOINTMENT (OUTPATIENT)
Dept: GASTROENTEROLOGY | Facility: GI CENTER | Age: 39
End: 2024-02-05
Payer: COMMERCIAL

## 2024-02-05 DIAGNOSIS — R10.13 EPIGASTRIC PAIN: ICD-10-CM

## 2024-02-05 PROCEDURE — 43239 EGD BIOPSY SINGLE/MULTIPLE: CPT

## 2024-02-05 PROCEDURE — 88342 IMHCHEM/IMCYTCHM 1ST ANTB: CPT | Mod: 26

## 2024-02-05 PROCEDURE — 88305 TISSUE EXAM BY PATHOLOGIST: CPT | Mod: 26

## 2024-02-05 PROCEDURE — 88305 TISSUE EXAM BY PATHOLOGIST: CPT

## 2024-02-05 PROCEDURE — 88342 IMHCHEM/IMCYTCHM 1ST ANTB: CPT

## 2024-02-05 NOTE — PHYSICAL EXAM
[Alert] : alert [Normal Voice/Communication] : normal voice/communication [Healthy Appearing] : healthy appearing [No Acute Distress] : no acute distress [Well Developed] : well developed [Well Nourished] : well nourished [Sclera] : the sclera and conjunctiva were normal [Hearing Threshold Finger Rub Not [place]] : hearing was normal [Normal Lips/Gums] : the lips and gums were normal [Oropharynx] : the oropharynx was normal [Normal Appearance] : the appearance of the neck was normal [No Neck Mass] : no neck mass was observed [No Respiratory Distress] : no respiratory distress [No Acc Muscle Use] : no accessory muscle use [Respiration, Rhythm And Depth] : normal respiratory rhythm and effort [Auscultation Breath Sounds / Voice Sounds] : lungs were clear to auscultation bilaterally [Heart Rate And Rhythm] : heart rate was normal and rhythm regular [Normal S1, S2] : normal S1 and S2 [Murmurs] : no murmurs [None] : no edema [Bowel Sounds] : normal bowel sounds [No Masses] : no abdominal mass palpated [Abdomen Soft] : soft [] : no hepatosplenomegaly [Epigastric] : in the epigastric area [Periumbilical] : not in the periumbilical area [Suprapubic] : not in the suprapubic area [RUQ] : not in the in the right upper quadrant [RLQ] : not in the right lower quadrant [LUQ] : not in the left upper quadrant [LLQ] : not in the left lower quadrant [No CVA Tenderness] : no CVA  tenderness [Abnormal Walk] : normal gait [No Joint Swelling] : no joint swelling seen [Normal Color / Pigmentation] : normal skin color and pigmentation [Oriented To Time, Place, And Person] : oriented to person, place, and time [de-identified] : Patient with epigastric tenderness to deep palpation [de-identified] : Deferred at this time

## 2024-02-05 NOTE — REVIEW OF SYSTEMS
[As Noted in HPI] : as noted in HPI [Abdominal Pain] : abdominal pain [Vomiting] : no vomiting [Constipation] : no constipation [Diarrhea] : no diarrhea [Heartburn] : no heartburn [Melena (black stool)] : no melena [Fecal Incontinence (soiling)] : no fecal incontinence [Bloating (gassiness)] : no bloating [Swollen Glands] : no swollen glands [Negative] : Heme/Lymph

## 2024-02-09 LAB — SURGICAL PATHOLOGY STUDY: SIGNIFICANT CHANGE UP

## 2024-02-14 ENCOUNTER — NON-APPOINTMENT (OUTPATIENT)
Age: 39
End: 2024-02-14

## 2024-04-15 ENCOUNTER — APPOINTMENT (OUTPATIENT)
Dept: GASTROENTEROLOGY | Facility: CLINIC | Age: 39
End: 2024-04-15
Payer: COMMERCIAL

## 2024-04-15 VITALS
RESPIRATION RATE: 14 BRPM | DIASTOLIC BLOOD PRESSURE: 73 MMHG | SYSTOLIC BLOOD PRESSURE: 117 MMHG | TEMPERATURE: 97.6 F | OXYGEN SATURATION: 99 % | WEIGHT: 156 LBS | HEIGHT: 68 IN | HEART RATE: 77 BPM | BODY MASS INDEX: 23.64 KG/M2

## 2024-04-15 DIAGNOSIS — Z09 ENCOUNTER FOR FOLLOW-UP EXAMINATION AFTER COMPLETED TREATMENT FOR CONDITIONS OTHER THAN MALIGNANT NEOPLASM: ICD-10-CM

## 2024-04-15 DIAGNOSIS — K21.9 GASTRO-ESOPHAGEAL REFLUX DISEASE W/OUT ESOPHAGITIS: ICD-10-CM

## 2024-04-15 DIAGNOSIS — R10.13 EPIGASTRIC PAIN: ICD-10-CM

## 2024-04-15 PROCEDURE — 99214 OFFICE O/P EST MOD 30 MIN: CPT

## 2024-04-15 RX ORDER — FAMOTIDINE 40 MG/1
40 TABLET, FILM COATED ORAL TWICE DAILY
Qty: 60 | Refills: 5 | Status: ACTIVE | COMMUNITY
Start: 2024-04-15 | End: 1900-01-01

## 2024-04-15 NOTE — ASSESSMENT
[FreeTextEntry1] : Impression: Chronic GERD with associated epigastric pain with negative recent EGD  Recommendations: Patient's acid suppression medication was changed from omeprazole to famotidine 40 mg once or twice daily.  Low-fat antireflux diet was recommended and explained to the patient today who was advised to return here in 3 months time for follow-up evaluation.  She appeared to understand all of the above instructions, information, and management plan.

## 2024-04-15 NOTE — REASON FOR VISIT
[Follow-up] : a follow-up of an existing diagnosis [FreeTextEntry1] : For chronic epigastric pain and GERD

## 2024-04-15 NOTE — PHYSICAL EXAM
[Alert] : alert [Normal Voice/Communication] : normal voice/communication [Healthy Appearing] : healthy appearing [No Acute Distress] : no acute distress [Well Developed] : well developed [Well Nourished] : well nourished [Sclera] : the sclera and conjunctiva were normal [Hearing Threshold Finger Rub Not [place]] : hearing was normal [Normal Lips/Gums] : the lips and gums were normal [Oropharynx] : the oropharynx was normal [Normal Appearance] : the appearance of the neck was normal [No Neck Mass] : no neck mass was observed [No Respiratory Distress] : no respiratory distress [No Acc Muscle Use] : no accessory muscle use [Respiration, Rhythm And Depth] : normal respiratory rhythm and effort [Auscultation Breath Sounds / Voice Sounds] : lungs were clear to auscultation bilaterally [Heart Rate And Rhythm] : heart rate was normal and rhythm regular [Normal S1, S2] : normal S1 and S2 [Murmurs] : no murmurs [None] : no edema [Bowel Sounds] : normal bowel sounds [No Masses] : no abdominal mass palpated [Abdomen Soft] : soft [] : no hepatosplenomegaly [Epigastric] : in the epigastric area [Periumbilical] : not in the periumbilical area [Suprapubic] : not in the suprapubic area [RUQ] : not in the in the right upper quadrant [RLQ] : not in the right lower quadrant [LUQ] : not in the left upper quadrant [LLQ] : not in the left lower quadrant [No CVA Tenderness] : no CVA  tenderness [Abnormal Walk] : normal gait [No Joint Swelling] : no joint swelling seen [Normal Color / Pigmentation] : normal skin color and pigmentation [Oriented To Time, Place, And Person] : oriented to person, place, and time [de-identified] : Patient with epigastric tenderness to deep palpation [de-identified] : Deferred at this time

## 2024-04-15 NOTE — REVIEW OF SYSTEMS
[As Noted in HPI] : as noted in HPI [Abdominal Pain] : abdominal pain [Vomiting] : no vomiting [Constipation] : no constipation [Diarrhea] : no diarrhea [Heartburn] : heartburn [Melena (black stool)] : no melena [Bleeding] : no bleeding [Fecal Incontinence (soiling)] : no fecal incontinence [Swollen Glands] : no swollen glands [Negative] : Heme/Lymph

## 2024-06-05 RX ORDER — OMEPRAZOLE 40 MG/1
40 CAPSULE, DELAYED RELEASE ORAL
Qty: 90 | Refills: 0 | Status: ACTIVE | COMMUNITY
Start: 2023-12-13 | End: 1900-01-01

## 2024-07-05 ENCOUNTER — RX RENEWAL (OUTPATIENT)
Age: 39
End: 2024-07-05

## 2024-07-08 ENCOUNTER — APPOINTMENT (OUTPATIENT)
Dept: GASTROENTEROLOGY | Facility: CLINIC | Age: 39
End: 2024-07-08

## 2024-10-15 ENCOUNTER — APPOINTMENT (OUTPATIENT)
Dept: FAMILY MEDICINE | Facility: CLINIC | Age: 39
End: 2024-10-15
Payer: COMMERCIAL

## 2024-10-15 VITALS
WEIGHT: 152 LBS | HEIGHT: 68 IN | TEMPERATURE: 97.1 F | DIASTOLIC BLOOD PRESSURE: 72 MMHG | OXYGEN SATURATION: 98 % | HEART RATE: 84 BPM | SYSTOLIC BLOOD PRESSURE: 112 MMHG | BODY MASS INDEX: 23.04 KG/M2

## 2024-10-15 DIAGNOSIS — F32.A ANXIETY DISORDER, UNSPECIFIED: ICD-10-CM

## 2024-10-15 DIAGNOSIS — F41.9 ANXIETY DISORDER, UNSPECIFIED: ICD-10-CM

## 2024-10-15 PROCEDURE — 99213 OFFICE O/P EST LOW 20 MIN: CPT

## 2024-12-09 ENCOUNTER — APPOINTMENT (OUTPATIENT)
Dept: FAMILY MEDICINE | Facility: CLINIC | Age: 39
End: 2024-12-09
Payer: COMMERCIAL

## 2024-12-09 VITALS
TEMPERATURE: 96.9 F | BODY MASS INDEX: 22.88 KG/M2 | WEIGHT: 151 LBS | OXYGEN SATURATION: 98 % | HEIGHT: 68 IN | SYSTOLIC BLOOD PRESSURE: 120 MMHG | HEART RATE: 76 BPM | DIASTOLIC BLOOD PRESSURE: 80 MMHG

## 2024-12-09 DIAGNOSIS — F41.9 ANXIETY DISORDER, UNSPECIFIED: ICD-10-CM

## 2024-12-09 DIAGNOSIS — Z00.00 ENCOUNTER FOR GENERAL ADULT MEDICAL EXAMINATION W/OUT ABNORMAL FINDINGS: ICD-10-CM

## 2024-12-09 DIAGNOSIS — F32.A ANXIETY DISORDER, UNSPECIFIED: ICD-10-CM

## 2024-12-09 PROCEDURE — 99395 PREV VISIT EST AGE 18-39: CPT

## 2024-12-31 ENCOUNTER — TRANSCRIPTION ENCOUNTER (OUTPATIENT)
Age: 39
End: 2024-12-31

## 2025-01-08 ENCOUNTER — TRANSCRIPTION ENCOUNTER (OUTPATIENT)
Age: 40
End: 2025-01-08

## 2025-01-09 ENCOUNTER — TRANSCRIPTION ENCOUNTER (OUTPATIENT)
Age: 40
End: 2025-01-09

## 2025-02-05 ENCOUNTER — APPOINTMENT (OUTPATIENT)
Dept: ORTHOPEDIC SURGERY | Facility: CLINIC | Age: 40
End: 2025-02-05
Payer: COMMERCIAL

## 2025-02-05 VITALS — BODY MASS INDEX: 22.73 KG/M2 | WEIGHT: 150 LBS | HEIGHT: 68 IN

## 2025-02-05 DIAGNOSIS — M79.602 PAIN IN LEFT ARM: ICD-10-CM

## 2025-02-05 DIAGNOSIS — M25.561 PAIN IN RIGHT KNEE: ICD-10-CM

## 2025-02-05 DIAGNOSIS — S89.91XA UNSPECIFIED INJURY OF RIGHT LOWER LEG, INITIAL ENCOUNTER: ICD-10-CM

## 2025-02-05 DIAGNOSIS — M23.91 UNSPECIFIED INTERNAL DERANGEMENT OF RIGHT KNEE: ICD-10-CM

## 2025-02-05 DIAGNOSIS — M79.18 MYALGIA, OTHER SITE: ICD-10-CM

## 2025-02-05 PROCEDURE — 73564 X-RAY EXAM KNEE 4 OR MORE: CPT | Mod: RT

## 2025-02-05 PROCEDURE — 99204 OFFICE O/P NEW MOD 45 MIN: CPT | Mod: 25

## 2025-03-31 ENCOUNTER — APPOINTMENT (OUTPATIENT)
Dept: ORTHOPEDIC SURGERY | Facility: CLINIC | Age: 40
End: 2025-03-31

## 2025-03-31 DIAGNOSIS — M23.91 UNSPECIFIED INTERNAL DERANGEMENT OF RIGHT KNEE: ICD-10-CM

## 2025-03-31 DIAGNOSIS — M25.561 PAIN IN RIGHT KNEE: ICD-10-CM

## 2025-03-31 DIAGNOSIS — M79.18 MYALGIA, OTHER SITE: ICD-10-CM

## 2025-03-31 PROCEDURE — 99214 OFFICE O/P EST MOD 30 MIN: CPT

## 2025-05-12 ENCOUNTER — APPOINTMENT (OUTPATIENT)
Dept: ORTHOPEDIC SURGERY | Facility: CLINIC | Age: 40
End: 2025-05-12
Payer: COMMERCIAL

## 2025-05-12 PROCEDURE — 99214 OFFICE O/P EST MOD 30 MIN: CPT

## 2025-05-13 ENCOUNTER — APPOINTMENT (OUTPATIENT)
Dept: MRI IMAGING | Facility: CLINIC | Age: 40
End: 2025-05-13
Payer: COMMERCIAL

## 2025-05-13 PROCEDURE — 73721 MRI JNT OF LWR EXTRE W/O DYE: CPT | Mod: RT

## 2025-05-21 ENCOUNTER — APPOINTMENT (OUTPATIENT)
Dept: ORTHOPEDIC SURGERY | Facility: CLINIC | Age: 40
End: 2025-05-21

## 2025-05-21 DIAGNOSIS — M79.18 MYALGIA, OTHER SITE: ICD-10-CM

## 2025-05-21 DIAGNOSIS — M23.91 UNSPECIFIED INTERNAL DERANGEMENT OF RIGHT KNEE: ICD-10-CM

## 2025-05-21 DIAGNOSIS — M25.561 PAIN IN RIGHT KNEE: ICD-10-CM

## 2025-05-21 DIAGNOSIS — S89.91XA UNSPECIFIED INJURY OF RIGHT LOWER LEG, INITIAL ENCOUNTER: ICD-10-CM

## 2025-05-21 PROCEDURE — 99214 OFFICE O/P EST MOD 30 MIN: CPT

## 2025-05-29 ENCOUNTER — RESULT REVIEW (OUTPATIENT)
Age: 40
End: 2025-05-29

## 2025-06-04 ENCOUNTER — APPOINTMENT (OUTPATIENT)
Dept: ORTHOPEDIC SURGERY | Facility: CLINIC | Age: 40
End: 2025-06-04

## 2025-06-04 DIAGNOSIS — M79.18 MYALGIA, OTHER SITE: ICD-10-CM

## 2025-06-04 DIAGNOSIS — S89.91XA UNSPECIFIED INJURY OF RIGHT LOWER LEG, INITIAL ENCOUNTER: ICD-10-CM

## 2025-06-04 DIAGNOSIS — M23.91 UNSPECIFIED INTERNAL DERANGEMENT OF RIGHT KNEE: ICD-10-CM

## 2025-06-04 DIAGNOSIS — M25.561 PAIN IN RIGHT KNEE: ICD-10-CM

## 2025-06-04 PROCEDURE — 99214 OFFICE O/P EST MOD 30 MIN: CPT

## 2025-06-27 ENCOUNTER — NON-APPOINTMENT (OUTPATIENT)
Age: 40
End: 2025-06-27

## 2025-06-28 ENCOUNTER — APPOINTMENT (OUTPATIENT)
Dept: FAMILY MEDICINE | Facility: CLINIC | Age: 40
End: 2025-06-28
Payer: COMMERCIAL

## 2025-06-28 VITALS
HEIGHT: 68 IN | WEIGHT: 147 LBS | OXYGEN SATURATION: 97 % | DIASTOLIC BLOOD PRESSURE: 70 MMHG | HEART RATE: 85 BPM | TEMPERATURE: 97.2 F | SYSTOLIC BLOOD PRESSURE: 120 MMHG | BODY MASS INDEX: 22.28 KG/M2

## 2025-06-28 PROBLEM — E78.5 MILD HYPERLIPIDEMIA: Status: ACTIVE | Noted: 2025-06-28

## 2025-06-28 PROBLEM — Z12.39 BREAST CANCER SCREENING: Status: ACTIVE | Noted: 2025-06-28

## 2025-06-28 PROCEDURE — G2211 COMPLEX E/M VISIT ADD ON: CPT | Mod: NC

## 2025-06-28 PROCEDURE — 99214 OFFICE O/P EST MOD 30 MIN: CPT

## 2025-06-28 PROCEDURE — 36415 COLL VENOUS BLD VENIPUNCTURE: CPT

## 2025-06-29 LAB
ALBUMIN SERPL ELPH-MCNC: 4.6 G/DL
ALP BLD-CCNC: 107 U/L
ALT SERPL-CCNC: 14 U/L
ANION GAP SERPL CALC-SCNC: 10 MMOL/L
AST SERPL-CCNC: 18 U/L
BASOPHILS # BLD AUTO: 0.03 K/UL
BASOPHILS NFR BLD AUTO: 0.8 %
BILIRUB SERPL-MCNC: 0.7 MG/DL
BUN SERPL-MCNC: 11 MG/DL
CALCIUM SERPL-MCNC: 9.6 MG/DL
CHLORIDE SERPL-SCNC: 104 MMOL/L
CHOLEST SERPL-MCNC: 171 MG/DL
CO2 SERPL-SCNC: 22 MMOL/L
CREAT SERPL-MCNC: 0.75 MG/DL
EGFRCR SERPLBLD CKD-EPI 2021: 104 ML/MIN/1.73M2
EOSINOPHIL # BLD AUTO: 0.11 K/UL
EOSINOPHIL NFR BLD AUTO: 2.8 %
GLUCOSE SERPL-MCNC: 80 MG/DL
HCT VFR BLD CALC: 37.7 %
HDLC SERPL-MCNC: 58 MG/DL
HGB BLD-MCNC: 12.2 G/DL
IMM GRANULOCYTES NFR BLD AUTO: 0 %
LDLC SERPL-MCNC: 96 MG/DL
LYMPHOCYTES # BLD AUTO: 1.37 K/UL
LYMPHOCYTES NFR BLD AUTO: 35.4 %
MAN DIFF?: NORMAL
MCHC RBC-ENTMCNC: 30 PG
MCHC RBC-ENTMCNC: 32.4 G/DL
MCV RBC AUTO: 92.9 FL
MONOCYTES # BLD AUTO: 0.47 K/UL
MONOCYTES NFR BLD AUTO: 12.1 %
NEUTROPHILS # BLD AUTO: 1.89 K/UL
NEUTROPHILS NFR BLD AUTO: 48.9 %
NONHDLC SERPL-MCNC: 112 MG/DL
PLATELET # BLD AUTO: 211 K/UL
POTASSIUM SERPL-SCNC: 4.6 MMOL/L
PROT SERPL-MCNC: 7.2 G/DL
RBC # BLD: 4.06 M/UL
RBC # FLD: 12 %
SODIUM SERPL-SCNC: 136 MMOL/L
TRIGL SERPL-MCNC: 88 MG/DL
WBC # FLD AUTO: 3.87 K/UL

## 2025-06-30 ENCOUNTER — TRANSCRIPTION ENCOUNTER (OUTPATIENT)
Age: 40
End: 2025-06-30

## 2025-07-16 ENCOUNTER — APPOINTMENT (OUTPATIENT)
Dept: MAMMOGRAPHY | Facility: CLINIC | Age: 40
End: 2025-07-16
Payer: COMMERCIAL

## 2025-07-16 ENCOUNTER — OUTPATIENT (OUTPATIENT)
Dept: OUTPATIENT SERVICES | Facility: HOSPITAL | Age: 40
LOS: 1 days | End: 2025-07-16
Payer: COMMERCIAL

## 2025-07-16 ENCOUNTER — TRANSCRIPTION ENCOUNTER (OUTPATIENT)
Age: 40
End: 2025-07-16

## 2025-07-16 ENCOUNTER — RESULT REVIEW (OUTPATIENT)
Age: 40
End: 2025-07-16

## 2025-07-16 ENCOUNTER — NON-APPOINTMENT (OUTPATIENT)
Age: 40
End: 2025-07-16

## 2025-07-16 DIAGNOSIS — Z00.8 ENCOUNTER FOR OTHER GENERAL EXAMINATION: ICD-10-CM

## 2025-07-16 DIAGNOSIS — Z12.39 ENCOUNTER FOR OTHER SCREENING FOR MALIGNANT NEOPLASM OF BREAST: ICD-10-CM

## 2025-07-16 PROCEDURE — 77067 SCR MAMMO BI INCL CAD: CPT

## 2025-07-16 PROCEDURE — 77067 SCR MAMMO BI INCL CAD: CPT | Mod: 26

## 2025-07-16 PROCEDURE — 77063 BREAST TOMOSYNTHESIS BI: CPT

## 2025-07-16 PROCEDURE — 77063 BREAST TOMOSYNTHESIS BI: CPT | Mod: 26

## 2025-07-17 ENCOUNTER — APPOINTMENT (OUTPATIENT)
Dept: OBGYN | Facility: CLINIC | Age: 40
End: 2025-07-17
Payer: COMMERCIAL

## 2025-07-17 VITALS
HEIGHT: 68 IN | SYSTOLIC BLOOD PRESSURE: 112 MMHG | WEIGHT: 150 LBS | DIASTOLIC BLOOD PRESSURE: 76 MMHG | BODY MASS INDEX: 22.73 KG/M2

## 2025-07-17 PROBLEM — R92.343 EXTREMELY DENSE TISSUE OF BOTH BREASTS ON MAMMOGRAPHY: Status: ACTIVE | Noted: 2025-07-17

## 2025-07-17 PROCEDURE — 99395 PREV VISIT EST AGE 18-39: CPT

## 2025-07-18 ENCOUNTER — TRANSCRIPTION ENCOUNTER (OUTPATIENT)
Age: 40
End: 2025-07-18

## 2025-07-21 ENCOUNTER — OUTPATIENT (OUTPATIENT)
Dept: OUTPATIENT SERVICES | Facility: HOSPITAL | Age: 40
LOS: 1 days | End: 2025-07-21
Payer: COMMERCIAL

## 2025-07-21 ENCOUNTER — APPOINTMENT (OUTPATIENT)
Dept: ULTRASOUND IMAGING | Facility: CLINIC | Age: 40
End: 2025-07-21
Payer: COMMERCIAL

## 2025-07-21 ENCOUNTER — RESULT REVIEW (OUTPATIENT)
Age: 40
End: 2025-07-21

## 2025-07-21 DIAGNOSIS — R92.343 MAMMOGRAPHIC EXTREME DENSITY, BILATERAL BREASTS: ICD-10-CM

## 2025-07-21 PROCEDURE — 76641 ULTRASOUND BREAST COMPLETE: CPT | Mod: 26,50

## 2025-07-21 PROCEDURE — 76641 ULTRASOUND BREAST COMPLETE: CPT

## 2025-07-22 LAB
CYTOLOGY CVX/VAG DOC THIN PREP: NORMAL
HPV HIGH+LOW RISK DNA PNL CVX: NOT DETECTED

## 2025-08-06 ENCOUNTER — APPOINTMENT (OUTPATIENT)
Dept: ORTHOPEDIC SURGERY | Facility: CLINIC | Age: 40
End: 2025-08-06
Payer: COMMERCIAL

## 2025-08-06 DIAGNOSIS — M25.561 PAIN IN RIGHT KNEE: ICD-10-CM

## 2025-08-06 DIAGNOSIS — M23.91 UNSPECIFIED INTERNAL DERANGEMENT OF RIGHT KNEE: ICD-10-CM

## 2025-08-06 DIAGNOSIS — S89.91XA UNSPECIFIED INJURY OF RIGHT LOWER LEG, INITIAL ENCOUNTER: ICD-10-CM

## 2025-08-06 DIAGNOSIS — M79.18 MYALGIA, OTHER SITE: ICD-10-CM

## 2025-08-06 PROCEDURE — 99214 OFFICE O/P EST MOD 30 MIN: CPT

## 2025-08-14 RX ORDER — VALACYCLOVIR 500 MG/1
500 TABLET, FILM COATED ORAL TWICE DAILY
Qty: 10 | Refills: 5 | Status: ACTIVE | COMMUNITY
Start: 2025-08-14 | End: 1900-01-01

## (undated) DEVICE — KIT DEFENDO 4 OLY 4 PC

## (undated) DEVICE — FORCEP ENDOJAW AGTR LC W NDL 2.8MM 230CM DISP